# Patient Record
Sex: FEMALE | Race: WHITE | Employment: OTHER | URBAN - METROPOLITAN AREA
[De-identification: names, ages, dates, MRNs, and addresses within clinical notes are randomized per-mention and may not be internally consistent; named-entity substitution may affect disease eponyms.]

---

## 2017-02-22 ENCOUNTER — TRANSFERRED RECORDS (OUTPATIENT)
Dept: HEALTH INFORMATION MANAGEMENT | Facility: CLINIC | Age: 52
End: 2017-02-22

## 2018-03-08 ENCOUNTER — TRANSFERRED RECORDS (OUTPATIENT)
Dept: HEALTH INFORMATION MANAGEMENT | Facility: CLINIC | Age: 53
End: 2018-03-08

## 2019-07-01 ENCOUNTER — TRANSFERRED RECORDS (OUTPATIENT)
Dept: HEALTH INFORMATION MANAGEMENT | Facility: CLINIC | Age: 54
End: 2019-07-01

## 2019-07-18 ENCOUNTER — HOSPITAL ENCOUNTER (EMERGENCY)
Facility: CLINIC | Age: 54
Discharge: HOME | End: 2019-07-18
Attending: EMERGENCY MEDICINE | Admitting: EMERGENCY MEDICINE
Payer: MEDICARE

## 2019-07-18 VITALS
RESPIRATION RATE: 24 BRPM | DIASTOLIC BLOOD PRESSURE: 85 MMHG | HEART RATE: 73 BPM | TEMPERATURE: 97.5 F | SYSTOLIC BLOOD PRESSURE: 155 MMHG | OXYGEN SATURATION: 100 %

## 2019-07-18 DIAGNOSIS — G40.909 SEIZURE DISORDER (H): ICD-10-CM

## 2019-07-18 LAB
ALBUMIN UR-MCNC: NEGATIVE MG/DL
ANION GAP SERPL CALCULATED.3IONS-SCNC: 4 MMOL/L (ref 3–14)
APPEARANCE UR: CLEAR
BACTERIA #/AREA URNS HPF: ABNORMAL /HPF
BASOPHILS # BLD AUTO: 0.1 10E9/L (ref 0–0.2)
BASOPHILS NFR BLD AUTO: 0.5 %
BILIRUB UR QL STRIP: NEGATIVE
BUN SERPL-MCNC: 11 MG/DL (ref 7–30)
CALCIUM SERPL-MCNC: 8.8 MG/DL (ref 8.5–10.1)
CHLORIDE SERPL-SCNC: 108 MMOL/L (ref 94–109)
CO2 SERPL-SCNC: 29 MMOL/L (ref 20–32)
COLOR UR AUTO: ABNORMAL
CREAT SERPL-MCNC: 0.68 MG/DL (ref 0.52–1.04)
DIFFERENTIAL METHOD BLD: NORMAL
EOSINOPHIL # BLD AUTO: 0.4 10E9/L (ref 0–0.7)
EOSINOPHIL NFR BLD AUTO: 4.1 %
ERYTHROCYTE [DISTWIDTH] IN BLOOD BY AUTOMATED COUNT: 13.4 % (ref 10–15)
GFR SERPL CREATININE-BSD FRML MDRD: >90 ML/MIN/{1.73_M2}
GLUCOSE BLDC GLUCOMTR-MCNC: 113 MG/DL (ref 70–99)
GLUCOSE BLDC GLUCOMTR-MCNC: 67 MG/DL (ref 70–99)
GLUCOSE BLDC GLUCOMTR-MCNC: 70 MG/DL (ref 70–99)
GLUCOSE BLDC GLUCOMTR-MCNC: 72 MG/DL (ref 70–99)
GLUCOSE BLDC GLUCOMTR-MCNC: 72 MG/DL (ref 70–99)
GLUCOSE BLDC GLUCOMTR-MCNC: 73 MG/DL (ref 70–99)
GLUCOSE BLDC GLUCOMTR-MCNC: 75 MG/DL (ref 70–99)
GLUCOSE BLDC GLUCOMTR-MCNC: 78 MG/DL (ref 70–99)
GLUCOSE BLDC GLUCOMTR-MCNC: 79 MG/DL (ref 70–99)
GLUCOSE SERPL-MCNC: 78 MG/DL (ref 70–99)
GLUCOSE UR STRIP-MCNC: NEGATIVE MG/DL
HCT VFR BLD AUTO: 41.8 % (ref 35–47)
HGB BLD-MCNC: 13.3 G/DL (ref 11.7–15.7)
HGB UR QL STRIP: NEGATIVE
IMM GRANULOCYTES # BLD: 0 10E9/L (ref 0–0.4)
IMM GRANULOCYTES NFR BLD: 0.1 %
INTERPRETATION ECG - MUSE: NORMAL
KETONES UR STRIP-MCNC: NEGATIVE MG/DL
LACTATE BLD-SCNC: 2.6 MMOL/L (ref 0.7–2)
LEUKOCYTE ESTERASE UR QL STRIP: NEGATIVE
LYMPHOCYTES # BLD AUTO: 3.2 10E9/L (ref 0.8–5.3)
LYMPHOCYTES NFR BLD AUTO: 35.1 %
MCH RBC QN AUTO: 29.3 PG (ref 26.5–33)
MCHC RBC AUTO-ENTMCNC: 31.8 G/DL (ref 31.5–36.5)
MCV RBC AUTO: 92 FL (ref 78–100)
MONOCYTES # BLD AUTO: 0.7 10E9/L (ref 0–1.3)
MONOCYTES NFR BLD AUTO: 8 %
MUCOUS THREADS #/AREA URNS LPF: PRESENT /LPF
NEUTROPHILS # BLD AUTO: 4.8 10E9/L (ref 1.6–8.3)
NEUTROPHILS NFR BLD AUTO: 52.2 %
NITRATE UR QL: NEGATIVE
NRBC # BLD AUTO: 0 10*3/UL
NRBC BLD AUTO-RTO: 0 /100
PH UR STRIP: 5.5 PH (ref 5–7)
PLATELET # BLD AUTO: 290 10E9/L (ref 150–450)
POTASSIUM SERPL-SCNC: 4.3 MMOL/L (ref 3.4–5.3)
RBC # BLD AUTO: 4.54 10E12/L (ref 3.8–5.2)
RBC #/AREA URNS AUTO: 0 /HPF (ref 0–2)
SODIUM SERPL-SCNC: 141 MMOL/L (ref 133–144)
SOURCE: ABNORMAL
SP GR UR STRIP: 1.01 (ref 1–1.03)
SQUAMOUS #/AREA URNS AUTO: 5 /HPF (ref 0–1)
UROBILINOGEN UR STRIP-MCNC: NORMAL MG/DL (ref 0–2)
WBC # BLD AUTO: 9.2 10E9/L (ref 4–11)
WBC #/AREA URNS AUTO: 1 /HPF (ref 0–5)

## 2019-07-18 PROCEDURE — 99284 EMERGENCY DEPT VISIT MOD MDM: CPT | Mod: 25 | Performed by: EMERGENCY MEDICINE

## 2019-07-18 PROCEDURE — 85025 COMPLETE CBC W/AUTO DIFF WBC: CPT | Performed by: EMERGENCY MEDICINE

## 2019-07-18 PROCEDURE — 93005 ELECTROCARDIOGRAM TRACING: CPT | Performed by: EMERGENCY MEDICINE

## 2019-07-18 PROCEDURE — 81001 URINALYSIS AUTO W/SCOPE: CPT | Performed by: EMERGENCY MEDICINE

## 2019-07-18 PROCEDURE — 80048 BASIC METABOLIC PNL TOTAL CA: CPT | Performed by: EMERGENCY MEDICINE

## 2019-07-18 PROCEDURE — 93010 ELECTROCARDIOGRAM REPORT: CPT | Mod: Z6 | Performed by: EMERGENCY MEDICINE

## 2019-07-18 PROCEDURE — 83605 ASSAY OF LACTIC ACID: CPT | Performed by: EMERGENCY MEDICINE

## 2019-07-18 PROCEDURE — 80175 DRUG SCREEN QUAN LAMOTRIGINE: CPT | Performed by: EMERGENCY MEDICINE

## 2019-07-18 PROCEDURE — 99285 EMERGENCY DEPT VISIT HI MDM: CPT | Mod: 25 | Performed by: EMERGENCY MEDICINE

## 2019-07-18 PROCEDURE — 00000146 ZZHCL STATISTIC GLUCOSE BY METER IP

## 2019-07-18 PROCEDURE — 25800025 ZZH RX 258: Performed by: EMERGENCY MEDICINE

## 2019-07-18 PROCEDURE — 96360 HYDRATION IV INFUSION INIT: CPT | Performed by: EMERGENCY MEDICINE

## 2019-07-18 PROCEDURE — 96361 HYDRATE IV INFUSION ADD-ON: CPT | Performed by: EMERGENCY MEDICINE

## 2019-07-18 RX ADMIN — DEXTROSE AND SODIUM CHLORIDE: 5; 450 INJECTION, SOLUTION INTRAVENOUS at 16:34

## 2019-07-18 ASSESSMENT — ENCOUNTER SYMPTOMS
NECK STIFFNESS: 0
CHILLS: 0
BRUISES/BLEEDS EASILY: 0
COLOR CHANGE: 0
POLYDIPSIA: 0
ARTHRALGIAS: 0
SEIZURES: 0
CONFUSION: 0
HEADACHES: 0
ABDOMINAL PAIN: 0
FEVER: 0
WEAKNESS: 0
NAUSEA: 0
VOMITING: 0
DIFFICULTY URINATING: 0
SHORTNESS OF BREATH: 0
LIGHT-HEADEDNESS: 0
EYE REDNESS: 0
ADENOPATHY: 0

## 2019-07-18 NOTE — ED TRIAGE NOTES
"Presents to ED via EMS from [facility]. She was accompanying  to clinic, was not the patient. At clinic she began to have \"seizure like activity\" per staff on site. EMS reports that she has a history of anxiety and that her  says \"this type of thing has happened four times before.\" Responds to questions in a semi-clear fashion, confused on arrival.  "

## 2019-07-18 NOTE — ED AVS SNAPSHOT
[facility], [place], Emergency Department  [address] [address]  Phone:  [phone]                                    Malka Sheikh   MRN: [id number]    Department:  [facility], Emergency Department   Date of Visit:  7/18/2019           After Visit Summary Signature Page    I have received my discharge instructions, and my questions have been answered. I have discussed any challenges I see with this plan with the nurse or doctor.    ..........................................................................................................................................  Patient/Patient Representative Signature      ..........................................................................................................................................  Patient Representative Print Name and Relationship to Patient    ..................................................               ................................................  Date                                   Time    ..........................................................................................................................................  Reviewed by Signature/Title    ...................................................              ..............................................  Date                                               Time          22EPIC Rev 08/18

## 2019-07-18 NOTE — ED NOTES
Bed: ED22  Expected date: 7/18/19  Expected time: 12:55 PM  Means of arrival: Ambulance  Comments:  H425  53yo F, anxiety after seizure, yellow 3min ETA

## 2019-07-18 NOTE — ED PROVIDER NOTES
[place] EMERGENCY DEPARTMENT ([facility])  7/18/19 ED 22   History     Chief Complaint   Patient presents with     Altered Mental Status     The history is provided by medical records. The history is limited by the condition of the patient.     Malka Sheikh is a 54 year old female who presents with altered mental status. Patient drove her  to clinic and at clinic she had a witnessed seizure-like spell in clinic and was sent to the ER for evaluation. The clinic did call and present history on patient, patient does see neurologist in [place], [place], and they reported that she also has diagnosis of seizures vs pseudoseizures.    No other history available.    She is new to the [employer] system and has no past medical records here.    I have reviewed the Medications, Allergies, Past Medical and Surgical History, and Social History in the Epic system.    Review of Systems   Unable to perform ROS: Mental status change   Constitutional: Negative for chills and fever.   HENT: Negative for congestion.    Eyes: Negative for redness.   Respiratory: Negative for shortness of breath.    Cardiovascular: Negative for chest pain.   Gastrointestinal: Negative for abdominal pain, nausea and vomiting.   Endocrine: Negative for polydipsia and polyuria.   Genitourinary: Negative for difficulty urinating.   Musculoskeletal: Negative for arthralgias and neck stiffness.   Skin: Negative for color change.   Neurological: Negative for seizures, weakness, light-headedness and headaches.   Hematological: Negative for adenopathy. Does not bruise/bleed easily.   Psychiatric/Behavioral: Negative for confusion.   All other systems reviewed and are negative.      Physical Exam   BP: 150/90  Pulse: 73  Heart Rate: 86  Temp: 97.5  F (36.4  C)  Resp: 18  SpO2: 99 %      Physical Exam   Constitutional: She is oriented to person, place, and time. She appears well-developed and well-nourished. No distress.   HENT:   Head: Normocephalic  and atraumatic.   Mouth/Throat: Oropharynx is clear and moist. No oropharyngeal exudate.   Eyes: Pupils are equal, round, and reactive to light. Conjunctivae and EOM are normal. No scleral icterus.   Neck: Normal range of motion.   Cardiovascular: Normal rate, normal heart sounds and intact distal pulses.   Pulmonary/Chest: Effort normal and breath sounds normal. No respiratory distress.   Abdominal: Soft. Bowel sounds are normal. There is no tenderness.   Musculoskeletal: Normal range of motion. She exhibits no edema or tenderness.   Neurological: She is alert and oriented to person, place, and time. She has normal strength. No cranial nerve deficit or sensory deficit. She exhibits normal muscle tone. Coordination normal. GCS eye subscore is 4. GCS verbal subscore is 5. GCS motor subscore is 6.   Patient twisting and turning, thrashing in bed spontaneously.  She is awake and alert and oriented to person, place, and time.  Is occasionally she will close her eyes for a few seconds and then open them spontaneously, but she will respond to my questioning.  Difficult to perform full neurologic exam due to patient twisting and turning in bed spontaneously.  I do not see any obvious tonic-clonic movements.   Skin: Skin is warm. No rash noted. She is not diaphoretic.   Psychiatric:   Difficult to obtain psychiatric exam due to acuity of condition.   Nursing note and vitals reviewed.      ED Course        Procedures             EKG Interpretation:      Interpreted by West Thomas  Time reviewed: 1:11 PM  Symptoms at time of EKG: Possible seizure  Rhythm: Accelerated junctional rhythm  Rate: normal  Axis: normal  Ectopy: none  Conduction: normal  ST Segments/ T Waves: No ST-T wave changes  Q Waves: none  Comparison to prior: No old EKG available    Clinical Impression: Accelerated junctional rhythm with no acute ischemia          Critical Care time:  none     The Lactic acid level is elevated due to possible seizure, at  this time there is no sign of severe sepsis or septic shock.       Labs Ordered and Resulted from Time of ED Arrival Up to the Time of Departure from the ED   LACTIC ACID WHOLE BLOOD - Abnormal; Notable for the following components:       Result Value    Lactic Acid 2.6 (*)     All other components within normal limits   ROUTINE UA WITH MICROSCOPIC - Abnormal; Notable for the following components:    Bacteria Urine Few (*)     Squamous Epithelial /HPF Urine 5 (*)     Mucous Urine Present (*)     All other components within normal limits   GLUCOSE BY METER - Abnormal; Notable for the following components:    Glucose 67 (*)     All other components within normal limits   CBC WITH PLATELETS DIFFERENTIAL   BASIC METABOLIC PANEL   GLUCOSE MONITOR NURSING POCT   GLUCOSE BY METER   GLUCOSE BY METER   LAMOTRIGINE FREE   LAMOTRIGINE LEVEL   GLUCOSE BY METER   LAMOTRIGINE LEVEL   LAMOTRIGINE FREE   PERIPHERAL IV CATHETER   PULSE OXIMETRY NURSING   CARDIAC CONTINUOUS MONITORING            Assessments & Plan (with Medical Decision Making)   54-year-old woman brought by ambulance from the clinic due to abnormal behavior.  Patient is awake and alert and she is able to respond to commands, however, intermittently she keeps twisting and turning in the bed and does not answer questions.  I will obtain laboratory studies and monitor her closely.  I highly doubt that this is CVA.    Laboratory studies returned with no leukocytosis, WBC is normal at 9200.  There is no anemia, hemoglobin is normal at 13.3.  Electrolytes show normal sodium at 141 and glucose of 78.  Lactic acid is minimally elevated at 2.6 and given the degree of patient presentation if she had true tonic/clonic seizure lactic acid should be much higher.  She will be monitored further in the emergency department.    2:15 PM  I was able to obtain care everywhere records for the patient and I reviewed them; it appears that she does have history of seizure disorder for  which she is on Lamictal.    3:05 PM  Patient reassessed.  Symptoms have significantly improved, however, she is still somewhat tired and groggy.  Her  arrived and I spoke to him; he states that she has history of seizures and that he has seen several of these episodes in the past as well.  Typically, her behavior normalizes within 1 to 2 hours after the seizure.  The  states that tonight they will spend the night at his cousin's house and that his cousin and her  will make sure that the patient and her , along with her car, return safely to [place] tomorrow.    3:06 PM  Patient will be signed out to my partner awaiting further observation until clearance of postictal state.    This part of the document was transcribed by Kishor Maria Scribe.      I have reviewed the nursing notes.    I have reviewed the findings, diagnosis, plan and need for follow up with the patient.       Medication List      There are no discharge medications for this visit.         Final diagnoses:   Seizure disorder (H)     Chanel WALLER, am serving as a trained medical scribe to document services personally performed by West Thomas MD MD based on the provider's statements to me on July 18, 2019.  This document has been checked and approved by the attending provider.    Martha WALLER Nikola, MD MD, was physically present and have reviewed and verified the accuracy of this note documented by kishor Sesay scribe.     7/18/2019   [facility], [place], EMERGENCY DEPARTMENT     West Thomas MD  07/18/19 1528       West Thomas MD  07/18/19 1542

## 2019-07-19 NOTE — DISCHARGE INSTRUCTIONS
Please make an appointment to follow up with Your Primary Care Provider in 1-2 days for further evaluation and recommendations.  If you experience another seizure in the next 24 hours please come back to the emergency department.

## 2019-07-20 LAB — LAMOTRIGINE SERPL-MCNC: <0.9 UG/ML (ref 2.5–15)

## 2019-07-30 ENCOUNTER — HOSPITAL ENCOUNTER (OUTPATIENT)
Facility: CLINIC | Age: 54
End: 2019-07-30
Attending: PSYCHIATRY & NEUROLOGY | Admitting: PSYCHIATRY & NEUROLOGY
Payer: COMMERCIAL

## 2020-02-26 ENCOUNTER — ALLIED HEALTH/NURSE VISIT (OUTPATIENT)
Dept: NEUROLOGY | Facility: CLINIC | Age: 55
End: 2020-02-26
Payer: COMMERCIAL

## 2020-02-26 ENCOUNTER — OFFICE VISIT (OUTPATIENT)
Dept: NEUROLOGY | Facility: CLINIC | Age: 55
End: 2020-02-26
Payer: COMMERCIAL

## 2020-02-26 VITALS — HEART RATE: 103 BPM | DIASTOLIC BLOOD PRESSURE: 90 MMHG | SYSTOLIC BLOOD PRESSURE: 160 MMHG | WEIGHT: 172.4 LBS

## 2020-02-26 DIAGNOSIS — R40.4 TRANSIENT ALTERATION OF AWARENESS: Primary | ICD-10-CM

## 2020-02-26 DIAGNOSIS — R56.9 CONVULSIONS, UNSPECIFIED CONVULSION TYPE (H): Primary | ICD-10-CM

## 2020-02-26 RX ORDER — LISINOPRIL 40 MG/1
40 TABLET ORAL
COMMUNITY
Start: 2018-08-14

## 2020-02-26 RX ORDER — LAMOTRIGINE 25 MG/1
TABLET ORAL
COMMUNITY
Start: 2020-02-24

## 2020-02-26 RX ORDER — PRAVASTATIN SODIUM 40 MG
40 TABLET ORAL
COMMUNITY
Start: 2018-08-14

## 2020-02-26 RX ORDER — VENLAFAXINE HYDROCHLORIDE 150 MG/1
150 CAPSULE, EXTENDED RELEASE ORAL
COMMUNITY
Start: 2014-08-28

## 2020-02-26 RX ORDER — CYANOCOBALAMIN 1000 UG/ML
INJECTION, SOLUTION INTRAMUSCULAR; SUBCUTANEOUS
COMMUNITY
Start: 2019-07-16

## 2020-02-26 RX ORDER — FERROUS SULFATE 325(65) MG
TABLET ORAL
COMMUNITY
Start: 2020-02-18

## 2020-02-26 SDOH — HEALTH STABILITY: MENTAL HEALTH: HOW OFTEN DO YOU HAVE A DRINK CONTAINING ALCOHOL?: NEVER

## 2020-02-26 ASSESSMENT — PAIN SCALES - GENERAL: PAINLEVEL: NO PAIN (0)

## 2020-02-26 NOTE — LETTER
Date:February 28, 2020      Patient was self referred, no letter generated. Do not send.        [facility] Information

## 2020-02-26 NOTE — PROCEDURES
EEG#: SL       DATE OF STUDY:  02/26/2020      TYPE OF STUDY:  Outpatient video EEG monitoring.        DURATION OF RECORDING:  3 hours 17 minutes 8 seconds.      CLINICAL HISTORY:  Outpatient video EEG monitoring performed on Malka Sheikh, a 54-year-old with spells of altered mental status during which jerking and other movements are noted.  These have been considered to possibly represent epileptic seizures.  She is being treated with lamotrigine 25 mg per day and clonazepam.  She is being evaluated for epilepsy.      TECHNICAL SUMMARY:  EEG is recorded from 23 scalp electrodes placed according to the 10-20 international system.  Additional electrodes were utilized for referencing, artifact detection and recording from other cerebral regions as needed.  Video was continuously recorded.  Video was reviewed for clinical correlation and to assist with EEG interpretation.      FINDINGS:  Diffuse fast activity during alert wakefulness.  As patient relaxes, a 10 Hz of posterior dominant rhythm is seen on occasion but low voltage 20-25 Hz activity is seen throughout.  No clear focal slowing.  Prolonged periods of drowsiness with slow eye movements and alpha variant are noted.  The patient does descend into a fairly long period of N2 sleep with vertex waves and sleep spindles.      Hyperventilation and photic stimulation does not add any information.      OTHER INTERICTAL ABNORMALITIES:  No clear epileptiform discharges.  The patient has a single sharpish theta transient noted at 11:33:36 a.m.  This is not clearly paroxysmal with multitudinal bipolar montage and becomes even less paroxysmal with CZ reference.  Overall, this is not considered to be an epileptiform transient.      ICTAL:  No electrographic seizures.      IMPRESSION:  Within normal limits.  No clear epileptiform discharges.  No seizures.  A normal EEG does not rule out the possibility of epilepsy because EEG may be persistently normal in up to 30% of  people with proven epilepsy.  Clinical correlation is needed.         DAVE GUAMAN MD             D: 2020   T: 2020   MT: PHILLIP      Name:     DOLORES GARCIA   MRN:      [id number]        Account:        [id number]   :      1965           Procedure Date: 2020      Document: [id number]
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
## 2020-02-26 NOTE — PROGRESS NOTES
CPT 09380,19303  OP/3hr Video EEG with 27 leads  [facility]

## 2020-02-26 NOTE — PATIENT INSTRUCTIONS
We could proceed with inpatient evalulation. Purpose would be to record one of your attacks. The attacks aren't happening very often at present so they may not be recorded while in the hospital. On the other hand we could probably bring them on with various exercises and maneuvers. Recording one of your attacks is the only way to determine the cause of your attacks. It would give you a clear answer.    You can arrange this by calling us as [phone] and talking to Annie Gonzalez or Marisol, our admissoin counsellors.

## 2020-02-27 ENCOUNTER — ALLIED HEALTH/NURSE VISIT (OUTPATIENT)
Dept: NEUROLOGY | Facility: CLINIC | Age: 55
End: 2020-02-27
Payer: COMMERCIAL

## 2020-02-27 DIAGNOSIS — R40.4 TRANSIENT ALTERATION OF AWARENESS: Primary | ICD-10-CM

## 2020-02-27 DIAGNOSIS — R56.9 CONVULSIONS, UNSPECIFIED CONVULSION TYPE (H): ICD-10-CM
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
## 2020-05-05 ENCOUNTER — VIRTUAL VISIT (OUTPATIENT)
Dept: NEUROLOGY | Facility: CLINIC | Age: 55
End: 2020-05-05
Payer: COMMERCIAL

## 2020-05-05 DIAGNOSIS — R56.9 CONVULSIONS, UNSPECIFIED CONVULSION TYPE (H): Primary | ICD-10-CM

## 2020-05-05 DIAGNOSIS — F41.9 ANXIETY: ICD-10-CM

## 2020-05-05 DIAGNOSIS — F33.1 MAJOR DEPRESSIVE DISORDER, RECURRENT EPISODE, MODERATE (H): ICD-10-CM

## 2020-05-05 DIAGNOSIS — F09 MENTAL DISORDER DUE TO GENERAL MEDICAL CONDITION: ICD-10-CM

## 2020-05-05 NOTE — PROGRESS NOTES
Pt was seen for telephone neuropsychological evaluation at the request of Dr. Josue Loving for the purposes of diagnostic clarification and treatment planning. 106 minutes of test administration and scoring were provided by this writer. Please see Chico Cee PhD report for a full interpretation of the findings.
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
## 2020-05-07 NOTE — PROGRESS NOTES
Name: Malka Sheikh MRN: [id number]  : 1965 SIDDIQI: 2020  Staff: [facility] Tech: MAURICIO Age: 55  Sex: Female Hand: Right Educ: 12      ORIENTATION     Time  -4     Place        Personal info         WAIS-IV     Raw SSa     Similarities  19 7     Vocabulary  36 9     Digit Span  19 6     COWAT (FAS)   Raw: 31   T: 41  Animals   Raw:  15  T-score:  41    COMPLEX IDEATIONAL MATERIAL   Raw:  T: 25    Oral TRAILS Raw  Err Z-Score    A 18  0 -11.6   B 40  0 -1.62    TSAT           Total Time: 142         Z-Score: -1.33           Total Errors: 2           Z-Score: 0.12    PHQ-9  Raw:  13  Interpretation: Moderate    EMELINA-7  Raw:  13  Interpretation: Moderate    HVLT-R     Trial 1 2 3 Total      8 9 9  26  Raw T     Total Learning (1-3) 26 46     Delayed Recall  10 50     Percent Retention 111 64     Recognition Hits/FP 11/0 51      INDEPENDENT LIVING SKILLS HEALTH AND SAFETY    Raw: 29     Classification: Low